# Patient Record
Sex: MALE | Race: WHITE | NOT HISPANIC OR LATINO | URBAN - METROPOLITAN AREA
[De-identification: names, ages, dates, MRNs, and addresses within clinical notes are randomized per-mention and may not be internally consistent; named-entity substitution may affect disease eponyms.]

---

## 2018-03-19 ENCOUNTER — EMERGENCY (EMERGENCY)
Facility: HOSPITAL | Age: 68
LOS: 1 days | Discharge: HOME | End: 2018-03-19
Attending: EMERGENCY MEDICINE | Admitting: EMERGENCY MEDICINE
Payer: MEDICARE

## 2018-03-19 VITALS
WEIGHT: 195.99 LBS | HEIGHT: 71 IN | RESPIRATION RATE: 14 BRPM | HEART RATE: 74 BPM | TEMPERATURE: 98 F | DIASTOLIC BLOOD PRESSURE: 88 MMHG | SYSTOLIC BLOOD PRESSURE: 135 MMHG | OXYGEN SATURATION: 98 %

## 2018-03-19 VITALS
DIASTOLIC BLOOD PRESSURE: 78 MMHG | HEART RATE: 75 BPM | TEMPERATURE: 98 F | SYSTOLIC BLOOD PRESSURE: 125 MMHG | RESPIRATION RATE: 15 BRPM | OXYGEN SATURATION: 100 %

## 2018-03-19 LAB — URATE SERPL-MCNC: 5.5 MG/DL — SIGNIFICANT CHANGE UP (ref 3.4–8.8)

## 2018-03-19 PROCEDURE — 84550 ASSAY OF BLOOD/URIC ACID: CPT

## 2018-03-19 PROCEDURE — 73562 X-RAY EXAM OF KNEE 3: CPT

## 2018-03-19 PROCEDURE — 73562 X-RAY EXAM OF KNEE 3: CPT | Mod: 26,LT

## 2018-03-19 PROCEDURE — 99283 EMERGENCY DEPT VISIT LOW MDM: CPT | Mod: 25

## 2018-03-19 PROCEDURE — 99283 EMERGENCY DEPT VISIT LOW MDM: CPT

## 2018-03-19 RX ORDER — ICOSAPENT ETHYL 500 MG/1
0 CAPSULE, LIQUID FILLED ORAL
Qty: 0 | Refills: 0 | COMMUNITY

## 2018-03-19 RX ADMIN — Medication 60 MILLIGRAM(S): at 18:59

## 2018-03-19 RX ADMIN — Medication 500 MILLIGRAM(S): at 18:59

## 2018-03-19 NOTE — ED ADULT NURSE NOTE - OBJECTIVE STATEMENT
Present to ER with c/o of left knee pain. Pt states he is unable to bend left knee. Denies any trauma.

## 2018-03-19 NOTE — ED PROVIDER NOTE - OBJECTIVE STATEMENT
68 yo white male with atraumatic left knee pain x few days, very sensitive to even light touch medial aspect of knee. States that he did eat a lot of shrimp with red wine just prior to attack. No fever or chills. No other joint pains. No rash.

## 2018-03-19 NOTE — ED PROVIDER NOTE - MUSCULOSKELETAL, MLM
Moderate tenderness to superficial percussion over medial aspect of left knee which itself is slightly warm. No effusion. Ligaments all normal

## 2018-03-19 NOTE — ED ADULT NURSE NOTE - NS ED NURSE DC INFO COMPLEXITY
Simple: Patient demonstrates quick and easy understanding/Complex: Multiple Rx/Tx. Pt has difficulty understanding. Requires additional help

## 2023-02-26 ENCOUNTER — EMERGENCY (EMERGENCY)
Facility: HOSPITAL | Age: 73
LOS: 1 days | Discharge: HOME | End: 2023-02-26
Attending: STUDENT IN AN ORGANIZED HEALTH CARE EDUCATION/TRAINING PROGRAM | Admitting: STUDENT IN AN ORGANIZED HEALTH CARE EDUCATION/TRAINING PROGRAM
Payer: MEDICARE

## 2023-02-26 VITALS
HEIGHT: 71 IN | HEART RATE: 60 BPM | TEMPERATURE: 97 F | OXYGEN SATURATION: 100 % | RESPIRATION RATE: 18 BRPM | DIASTOLIC BLOOD PRESSURE: 101 MMHG | SYSTOLIC BLOOD PRESSURE: 181 MMHG | WEIGHT: 199.96 LBS

## 2023-02-26 VITALS
TEMPERATURE: 97 F | DIASTOLIC BLOOD PRESSURE: 94 MMHG | HEART RATE: 60 BPM | RESPIRATION RATE: 18 BRPM | OXYGEN SATURATION: 97 % | SYSTOLIC BLOOD PRESSURE: 158 MMHG

## 2023-02-26 PROBLEM — E78.5 HYPERLIPIDEMIA, UNSPECIFIED: Chronic | Status: ACTIVE | Noted: 2018-03-19

## 2023-02-26 PROCEDURE — 70450 CT HEAD/BRAIN W/O DYE: CPT | Mod: 26,MA

## 2023-02-26 PROCEDURE — 70450 CT HEAD/BRAIN W/O DYE: CPT | Mod: MA

## 2023-02-26 PROCEDURE — 99284 EMERGENCY DEPT VISIT MOD MDM: CPT

## 2023-02-26 PROCEDURE — 99284 EMERGENCY DEPT VISIT MOD MDM: CPT | Mod: 25

## 2023-02-26 PROCEDURE — 73562 X-RAY EXAM OF KNEE 3: CPT | Mod: 26,LT

## 2023-02-26 PROCEDURE — 72125 CT NECK SPINE W/O DYE: CPT | Mod: MA

## 2023-02-26 PROCEDURE — 72125 CT NECK SPINE W/O DYE: CPT | Mod: 26,MA

## 2023-02-26 PROCEDURE — 73562 X-RAY EXAM OF KNEE 3: CPT

## 2023-02-26 PROCEDURE — 90471 IMMUNIZATION ADMIN: CPT

## 2023-02-26 PROCEDURE — 90715 TDAP VACCINE 7 YRS/> IM: CPT

## 2023-02-26 PROCEDURE — 71045 X-RAY EXAM CHEST 1 VIEW: CPT | Mod: 26

## 2023-02-26 PROCEDURE — 71045 X-RAY EXAM CHEST 1 VIEW: CPT

## 2023-02-26 RX ORDER — CARBIDOPA AND LEVODOPA 25; 100 MG/1; MG/1
1 TABLET ORAL ONCE
Refills: 0 | Status: COMPLETED | OUTPATIENT
Start: 2023-02-26 | End: 2023-02-26

## 2023-02-26 RX ORDER — BACITRACIN ZINC 500 UNIT/G
1 OINTMENT IN PACKET (EA) TOPICAL ONCE
Refills: 0 | Status: COMPLETED | OUTPATIENT
Start: 2023-02-26 | End: 2023-02-26

## 2023-02-26 RX ORDER — TETANUS TOXOID, REDUCED DIPHTHERIA TOXOID AND ACELLULAR PERTUSSIS VACCINE, ADSORBED 5; 2.5; 8; 8; 2.5 [IU]/.5ML; [IU]/.5ML; UG/.5ML; UG/.5ML; UG/.5ML
0.5 SUSPENSION INTRAMUSCULAR ONCE
Refills: 0 | Status: COMPLETED | OUTPATIENT
Start: 2023-02-26 | End: 2023-02-26

## 2023-02-26 RX ORDER — ACETAMINOPHEN 500 MG
650 TABLET ORAL ONCE
Refills: 0 | Status: COMPLETED | OUTPATIENT
Start: 2023-02-26 | End: 2023-02-26

## 2023-02-26 RX ADMIN — Medication 1 APPLICATION(S): at 13:55

## 2023-02-26 RX ADMIN — CARBIDOPA AND LEVODOPA 1 TABLET(S): 25; 100 TABLET ORAL at 11:46

## 2023-02-26 RX ADMIN — Medication 650 MILLIGRAM(S): at 11:46

## 2023-02-26 RX ADMIN — TETANUS TOXOID, REDUCED DIPHTHERIA TOXOID AND ACELLULAR PERTUSSIS VACCINE, ADSORBED 0.5 MILLILITER(S): 5; 2.5; 8; 8; 2.5 SUSPENSION INTRAMUSCULAR at 11:46

## 2023-02-26 NOTE — ED PROVIDER NOTE - CLINICAL SUMMARY MEDICAL DECISION MAKING FREE TEXT BOX
73yo M ho parkinsons pw fall down steps. pt was leaving [employer] and lost balance on steps and fell down about 10 steps hitting his head at bottom. no loc, no headache, no nausea, no vomiitng, pt sat up after fall andw aited for ems. denies neck or back pain. pt sustained multiple abrasions. no ac use  on exam abrasions noted to head, elbow and knee, no bonmy tenderness, no deformities, neuro intact, no neck or back tenderness, no rib tendenress, will get CT head/cspine, screening xrays 71yo M ho parkinsons pw fall down steps. pt was leaving [employer] and lost balance on steps and fell down about 10 steps hitting his head at bottom. no loc, no headache, no nausea, no vomiitng, pt sat up after fall andw aited for ems. denies neck or back pain. pt sustained multiple abrasions. no ac use  on exam abrasions noted to head, elbow and knee, no bony tenderness, no deformities, neuro intact, no neck or back tenderness, no rib tendenress, will get CT head/cspine, screening xrays

## 2023-02-26 NOTE — ED PROVIDER NOTE - PATIENT PORTAL LINK FT
You can access the FollowMyHealth Patient Portal offered by [facility] by registering at the following website: [email or web address]. By joining [facility]’s FollowMyHealth portal, you will also be able to view your health information using other applications (apps) compatible with our system.

## 2023-02-26 NOTE — ED ADULT TRIAGE NOTE - CHIEF COMPLAINT QUOTE
Patient A/Ox4 with clear speech. BIBA for trip and fall down [employer] stairs, per EMS ~ 15 steps. C-collar applied by EMS. Patient denies pain. Facial lac to left cheek noted. Denies LOC or AC therapy. Patient A/Ox4 with clear speech. BIBA for trip and fall down [employer] stairs, per EMS ~ 15 steps. C-collar applied by EMS. Patient denies pain. Facial lac to left cheek noted as well as abrasions to BL hands. Denies LOC or AC therapy.

## 2023-02-26 NOTE — ED PROVIDER NOTE - NSFOLLOWUPINSTRUCTIONS_ED_ALL_ED_FT
1. TAKE ALL MEDICATIONS AS DIRECTED.    2. FOR PAIN OR FEVER YOU CAN TAKE IBUPROFEN (MOTRIN, ADVIL) OR ACETAMINOPHEN (TYLENOL) AS NEEDED, AS DIRECTED ON PACKAGING.  3. FOLLOW UP WITH YOUR PRIMARY DOCTOR WITHIN 5 DAYS AS DIRECTED.  4. IF YOU HAD LABS OR IMAGING DONE, YOU WERE GIVEN COPIES OF ALL LABS AND/OR IMAGING RESULTS FROM YOUR ER VISIT--PLEASE TAKE THEM WITH YOU TO YOUR FOLLOW UP APPOINTMENTS.  5. IF NEEDED, CALL PATIENT ACCESS SERVICES AT [phone] TO FIND A PRIMARY CARE PHYSICIAN.  OR CALL [phone] TO MAKE AN APPOINTMENT WITH THE CLINIC.  6. RETURN TO THE ER FOR ANY WORSENING SYMPTOMS OR CONCERNS.    Closed Head Injury    A closed head injury is an injury to your head that may or may not involve a traumatic brain injury (TBI). Symptoms of TBI can be short or long lasting and include headache, dizziness, interference with memory or speech, fatigue, confusion, changes in sleep, mood changes, nausea, depression/anxiety, and dulling of senses. Make sure to obtain proper rest which includes getting plenty of sleep, avoiding excessive visual stimulation, and avoiding activities that may cause physical or mental stress. Avoid any situation where there is potential for another head injury, including sports.    SEEK IMMEDIATE MEDICAL CARE IF YOU HAVE ANY OF THE FOLLOWING SYMPTOMS: unusual drowsiness, vomiting, severe dizziness, seizures, lightheadedness, muscular weakness, different pupil sizes, visual changes, or clear or bloody discharge from your ears or nose.

## 2023-02-26 NOTE — ED PROVIDER NOTE - OBJECTIVE STATEMENT
73yo M ho parkinsons pw fall down steps. pt was leaving [employer] and lost balance on steps and fell down about 10 steps hitting his head at bottom. no loc, no headache, no nausea, no vomiitng, pt sat up after fall andw aited for ems. denies neck or back pain. pt sustained multiple abrasions. no ac use

## 2023-02-26 NOTE — ED PROVIDER NOTE - PHYSICAL EXAMINATION
Gen: Well appearing in NAD  Head: NC/AT  Neck: trachea midline, no midline tenderness, from without pain, no back tenderness  cv: rrr  Resp:  No distress  Ext: no deformities, no deformities, no tenderness, from of all extremities  Neuro:  A&O appears non focal, motor and sensory intact  Skin:  Warm and dry as visualized, laceration lateral to left eye, abrasions on scalp, skin tear left elbow, abrasion left knee   Psych:  Normal affect and mood

## 2023-02-26 NOTE — ED ADULT NURSE NOTE - CHIEF COMPLAINT QUOTE
Patient A/Ox4 with clear speech. BIBA for trip and fall down [employer] stairs, per EMS ~ 15 steps. C-collar applied by EMS. Patient denies pain. Facial lac to left cheek noted as well as abrasions to BL hands. Denies LOC or AC therapy.

## 2023-02-26 NOTE — ED ADULT NURSE NOTE - OBJECTIVE STATEMENT
71y/o male A&ox4, ambulatory received in rm16a. pt c/o trip and fall down 10 stairs outside of [employer]. pmhx parkinsons. denies loc, dizziness, lightheadedness, blurry vision, n/v. abrasions noted to L elbow, L cheek, posterior head. no active bleeding noted. denies AC use. medicated as per md orders. pt in NAD, respirations even and unlabored. bed in lowest position, side rails up, call bell in hand, safety maintained. awaiting further orders. will continue to monitor.

## 2023-11-04 PROBLEM — Z00.00 ENCOUNTER FOR PREVENTIVE HEALTH EXAMINATION: Status: ACTIVE | Noted: 2023-11-04

## 2023-12-06 ENCOUNTER — APPOINTMENT (OUTPATIENT)
Dept: RHEUMATOLOGY | Facility: CLINIC | Age: 73
End: 2023-12-06

## 2024-02-22 NOTE — ED ADULT TRIAGE NOTE - HEIGHT IN FEET
Pt received cupping for mobility and to decrease pain. Pt performed hip mobility exercises and shoulder stability exercises.   
5

## 2024-04-01 NOTE — ED ADULT NURSE NOTE - TEMPLATE
Free fluid in LUQ  [] Free fluid in Pelvis  [] Pericardial fluid  [] Other:        RADIOLOGY  CT HEAD WO CONTRAST    (Results Pending)   CT CHEST ABDOMEN PELVIS W CONTRAST Additional Contrast? None    (Results Pending)   CT CERVICAL SPINE WO CONTRAST    (Results Pending)   CT THORACIC SPINE BONY RECONSTRUCTION    (Results Pending)   CT LUMBAR SPINE BONY RECONSTRUCTION    (Results Pending)         LABS  Labs Reviewed   TRAUMA PANEL - Abnormal; Notable for the following components:       Result Value    RBC 5.94 (*)     MCV 79.8 (*)     RDW 14.5 (*)     All other components within normal limits   TYPE AND SCREEN         Natalia Waite DO  4/1/24, 5:56 PM   
Fall